# Patient Record
Sex: MALE | Race: WHITE | Employment: FULL TIME | URBAN - METROPOLITAN AREA
[De-identification: names, ages, dates, MRNs, and addresses within clinical notes are randomized per-mention and may not be internally consistent; named-entity substitution may affect disease eponyms.]

---

## 2020-09-10 ENCOUNTER — OFFICE VISIT (OUTPATIENT)
Dept: INTERNAL MEDICINE CLINIC | Facility: CLINIC | Age: 40
End: 2020-09-10
Payer: COMMERCIAL

## 2020-09-10 ENCOUNTER — APPOINTMENT (OUTPATIENT)
Dept: LAB | Age: 40
End: 2020-09-10
Attending: INTERNAL MEDICINE
Payer: COMMERCIAL

## 2020-09-10 ENCOUNTER — HOSPITAL ENCOUNTER (OUTPATIENT)
Dept: GENERAL RADIOLOGY | Facility: HOSPITAL | Age: 40
Discharge: HOME | End: 2020-09-10
Attending: INTERNAL MEDICINE
Payer: COMMERCIAL

## 2020-09-10 VITALS
HEART RATE: 69 BPM | SYSTOLIC BLOOD PRESSURE: 122 MMHG | WEIGHT: 166.63 LBS | BODY MASS INDEX: 23.33 KG/M2 | OXYGEN SATURATION: 99 % | TEMPERATURE: 97 F | DIASTOLIC BLOOD PRESSURE: 88 MMHG | HEIGHT: 70.8 IN

## 2020-09-10 DIAGNOSIS — Z00.00 ENCOUNTER FOR ANNUAL PHYSICAL EXAM: ICD-10-CM

## 2020-09-10 DIAGNOSIS — M10.9 ACUTE GOUT INVOLVING TOE OF RIGHT FOOT, UNSPECIFIED CAUSE: Primary | ICD-10-CM

## 2020-09-10 DIAGNOSIS — M10.9 ACUTE GOUT INVOLVING TOE OF RIGHT FOOT, UNSPECIFIED CAUSE: ICD-10-CM

## 2020-09-10 LAB
ALBUMIN SERPL-MCNC: 4.1 G/DL (ref 3.4–5)
ALBUMIN/GLOB SERPL: 1.2 {RATIO} (ref 1–2)
ALP LIVER SERPL-CCNC: 55 U/L (ref 45–117)
ALT SERPL-CCNC: 26 U/L (ref 16–61)
ANION GAP SERPL CALC-SCNC: 7 MMOL/L (ref 0–18)
AST SERPL-CCNC: 23 U/L (ref 15–37)
BASOPHILS # BLD AUTO: 0.01 X10(3) UL (ref 0–0.2)
BASOPHILS NFR BLD AUTO: 0.2 %
BILIRUB SERPL-MCNC: 0.6 MG/DL (ref 0.1–2)
BUN BLD-MCNC: 14 MG/DL (ref 7–18)
BUN/CREAT SERPL: 13.5 (ref 10–20)
CALCIUM BLD-MCNC: 10 MG/DL (ref 8.5–10.1)
CHLORIDE SERPL-SCNC: 104 MMOL/L (ref 98–112)
CHOLEST SMN-MCNC: 217 MG/DL (ref ?–200)
CO2 SERPL-SCNC: 28 MMOL/L (ref 21–32)
CREAT BLD-MCNC: 1.04 MG/DL (ref 0.7–1.3)
CRP SERPL-MCNC: 3.71 MG/DL (ref ?–0.3)
DEPRECATED RDW RBC AUTO: 41 FL (ref 35.1–46.3)
EOSINOPHIL # BLD AUTO: 0.06 X10(3) UL (ref 0–0.7)
EOSINOPHIL NFR BLD AUTO: 1 %
ERYTHROCYTE [DISTWIDTH] IN BLOOD BY AUTOMATED COUNT: 12.3 % (ref 11–15)
EST. AVERAGE GLUCOSE BLD GHB EST-MCNC: 103 MG/DL (ref 68–126)
GLOBULIN PLAS-MCNC: 3.4 G/DL (ref 2.8–4.4)
GLUCOSE BLD-MCNC: 79 MG/DL (ref 70–99)
HBA1C MFR BLD HPLC: 5.2 % (ref ?–5.7)
HCT VFR BLD AUTO: 42.9 % (ref 39–53)
HDLC SERPL-MCNC: 53 MG/DL (ref 40–59)
HGB BLD-MCNC: 14.3 G/DL (ref 13–17.5)
IMM GRANULOCYTES # BLD AUTO: 0.01 X10(3) UL (ref 0–1)
IMM GRANULOCYTES NFR BLD: 0.2 %
LDLC SERPL CALC-MCNC: 150 MG/DL (ref ?–100)
LYMPHOCYTES # BLD AUTO: 1.65 X10(3) UL (ref 1–4)
LYMPHOCYTES NFR BLD AUTO: 27.1 %
M PROTEIN MFR SERPL ELPH: 7.5 G/DL (ref 6.4–8.2)
MCH RBC QN AUTO: 30.6 PG (ref 26–34)
MCHC RBC AUTO-ENTMCNC: 33.3 G/DL (ref 31–37)
MCV RBC AUTO: 91.7 FL (ref 80–100)
MONOCYTES # BLD AUTO: 0.41 X10(3) UL (ref 0.1–1)
MONOCYTES NFR BLD AUTO: 6.7 %
NEUTROPHILS # BLD AUTO: 3.95 X10 (3) UL (ref 1.5–7.7)
NEUTROPHILS # BLD AUTO: 3.95 X10(3) UL (ref 1.5–7.7)
NEUTROPHILS NFR BLD AUTO: 64.8 %
NONHDLC SERPL-MCNC: 164 MG/DL (ref ?–130)
OSMOLALITY SERPL CALC.SUM OF ELEC: 287 MOSM/KG (ref 275–295)
PATIENT FASTING Y/N/NP: NO
PATIENT FASTING Y/N/NP: NO
PLATELET # BLD AUTO: 195 10(3)UL (ref 150–450)
POTASSIUM SERPL-SCNC: 4.1 MMOL/L (ref 3.5–5.1)
RBC # BLD AUTO: 4.68 X10(6)UL (ref 4.3–5.7)
SODIUM SERPL-SCNC: 139 MMOL/L (ref 136–145)
T4 FREE SERPL-MCNC: 1.4 NG/DL (ref 0.8–1.7)
TRIGL SERPL-MCNC: 70 MG/DL (ref 30–149)
TSI SER-ACNC: 2.08 MIU/ML (ref 0.36–3.74)
URATE SERPL-MCNC: 5.7 MG/DL (ref 3.5–7.2)
VLDLC SERPL CALC-MCNC: 14 MG/DL (ref 0–30)
WBC # BLD AUTO: 6.1 X10(3) UL (ref 4–11)

## 2020-09-10 PROCEDURE — 83036 HEMOGLOBIN GLYCOSYLATED A1C: CPT | Performed by: INTERNAL MEDICINE

## 2020-09-10 PROCEDURE — 3008F BODY MASS INDEX DOCD: CPT | Performed by: INTERNAL MEDICINE

## 2020-09-10 PROCEDURE — 84439 ASSAY OF FREE THYROXINE: CPT | Performed by: INTERNAL MEDICINE

## 2020-09-10 PROCEDURE — 3074F SYST BP LT 130 MM HG: CPT | Performed by: INTERNAL MEDICINE

## 2020-09-10 PROCEDURE — 85025 COMPLETE CBC W/AUTO DIFF WBC: CPT | Performed by: INTERNAL MEDICINE

## 2020-09-10 PROCEDURE — 36415 COLL VENOUS BLD VENIPUNCTURE: CPT | Performed by: INTERNAL MEDICINE

## 2020-09-10 PROCEDURE — 3079F DIAST BP 80-89 MM HG: CPT | Performed by: INTERNAL MEDICINE

## 2020-09-10 PROCEDURE — 73630 X-RAY EXAM OF FOOT: CPT | Performed by: INTERNAL MEDICINE

## 2020-09-10 PROCEDURE — 84550 ASSAY OF BLOOD/URIC ACID: CPT | Performed by: INTERNAL MEDICINE

## 2020-09-10 PROCEDURE — 80061 LIPID PANEL: CPT | Performed by: INTERNAL MEDICINE

## 2020-09-10 PROCEDURE — 80053 COMPREHEN METABOLIC PANEL: CPT | Performed by: INTERNAL MEDICINE

## 2020-09-10 PROCEDURE — 86140 C-REACTIVE PROTEIN: CPT | Performed by: INTERNAL MEDICINE

## 2020-09-10 PROCEDURE — 84443 ASSAY THYROID STIM HORMONE: CPT | Performed by: INTERNAL MEDICINE

## 2020-09-10 PROCEDURE — 99203 OFFICE O/P NEW LOW 30 MIN: CPT | Performed by: INTERNAL MEDICINE

## 2020-09-10 RX ORDER — NAPROXEN 500 MG/1
500 TABLET ORAL 2 TIMES DAILY WITH MEALS
Qty: 60 TABLET | Refills: 0 | Status: SHIPPED | OUTPATIENT
Start: 2020-09-10 | End: 2020-11-04

## 2020-09-10 RX ORDER — IBUPROFEN 600 MG/1
600 TABLET ORAL 2 TIMES DAILY
COMMUNITY
End: 2020-11-04

## 2020-09-10 NOTE — PROGRESS NOTES
Chief Complaint:   Patient presents with:  Pain: right big toe pain ,started monday morning, swollen red and bruises    Language Barrier: None    HPI:     Mr. Mitra Castro is a 47year old male past medical history of GERD who presents today for an acute visit fo use: Yes      Alcohol/week: 0.0 standard drinks      Comment: Occasional    Drug use: No    Family History:  Family History   Problem Relation Age of Onset   • Hypertension Father    • Heart Disease Father         MI age 46s   • Other (Other) Father    • C pulse 69, temperature 97 °F (36.1 °C), temperature source Temporal, height 5' 10.8\" (1.798 m), weight 166 lb 9.6 oz (75.6 kg), SpO2 99 %. Constitutional: No acute distress. Alert and oriented x 3.   Eyes: EOMI, clear sclera b/l  HENT: NCAT  Cardiovascu 500 mg twice a day for at least 5 to 7 days. Discussed the definitive duration of therapy is dependent on clinical improvement. Recommend 2 to 3 days further from resolution of symptoms  – Alternative medications available to the patient.   Discussed use

## 2020-09-10 NOTE — PATIENT INSTRUCTIONS
You were seen in clinic for right big toe pain. We are concerned for possible fracture versus a first-time gout occurrence. We will obtain an x-ray to be done for further evaluation.     If this is an acute fracture, we will refer you to a sports medicine

## 2020-09-11 ENCOUNTER — TELEPHONE (OUTPATIENT)
Dept: INTERNAL MEDICINE CLINIC | Facility: CLINIC | Age: 40
End: 2020-09-11

## 2020-09-11 DIAGNOSIS — M10.9 ACUTE GOUT INVOLVING TOE OF RIGHT FOOT, UNSPECIFIED CAUSE: Primary | ICD-10-CM

## 2020-09-11 NOTE — TELEPHONE ENCOUNTER
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 Notable blood work with total cholesterol 217, LDL cholesterol 150, CRP 3.71.      Rest of CMP, CBC, thyroid studies within n

## 2020-09-16 ENCOUNTER — TELEPHONE (OUTPATIENT)
Dept: INTERNAL MEDICINE CLINIC | Facility: CLINIC | Age: 40
End: 2020-09-16

## 2020-09-16 DIAGNOSIS — M10.9 ACUTE GOUT INVOLVING TOE OF RIGHT FOOT, UNSPECIFIED CAUSE: Primary | ICD-10-CM

## 2020-09-16 NOTE — TELEPHONE ENCOUNTER
Brief telephone note: Condition update    Attempted to call patient for condition update regarding right gout flare. Left voicemail. Wanted to make sure that treatment plan with naproxen is leading to continued improvement of gout.   Advised the patient c

## 2020-09-21 NOTE — PATIENT INSTRUCTIONS
- You were seen in clinic for regular annual check-up. We have reviewed your labs that were previously drawn. No new medications needed, we will repeat labs in 1 year  -The inflammation of your prior gout flare is improving.   Would recommend to take ibup

## 2020-09-21 NOTE — H&P
Eleuteriomathieu Wang is a 47year old male. Language Barrier: None    HPI:   Patient presents with:  Physical: annual physical exam, reports gout in right big toe has mostly resolved.       Mr. Savi Wilsno is a 47year old male past medical history of previous broken ri care of himself with a poor diet. Did not smoke regularly. - Mother: thyroid cancer that was successfully condition. Possibly environmental.   - Depression–father    SocHX  - Home: lives at home with wife and 2 daughters. Doing well at home. Feels safe. Chills, Weight Gain, Weight Loss, Night Sweats, Fatigue, Malaise  ENT/Mouth:  Hearing Changes, Ear Pain, Nasal Congestion, Sinus Pain, Hoarseness, Sore throat, Rhinorrhea, Swallowing Difficulty  Eyes: Eye Pain, Swelling, Redness, Foreign Body, Discharge, V bilaterally  Neurologic: No focal neurological deficits, CN II-XII intact, light touch intact, MSK Strength 5/5 and symmetric in all extremities, normal gait; normal rapid alternating movements, normal coordination with finger-to-nose and heel-to-shin, francis vitamin B7, vitamin H, coenzyme R) supplements resulting in serum concentrations >100 ng/mL.   Intake of the recommended daily allowance (RDA) for biotin (0.03 mg) has not been shown to typically cause significant interference; however, high dose daily diet mg/dL  Borderline  200-239 mg/dL  High      >=240 mg/dL       • HDL Cholesterol 09/10/2020 53  40 - 59 mg/dL Final    Interpretive Information:   An HDL cholesterol <40 mg/dL is low and constitutes a coronary heart disease risk factor.  An HDL cholesterol > 27.1  % Final   • Monocyte % 09/10/2020 6.7  % Final   • Eosinophil % 09/10/2020 1.0  % Final   • Basophil % 09/10/2020 0.2  % Final   • Immature Granulocyte % 09/10/2020 0.2  % Final       Imaging:  X-ray of right foot 9/10/2020  FINDINGS:          BONES: benign etiology. –Patient would like to start a food journal to see any triggering factors. Will improve hydration if it is related to constipation. –Offered dicyclomine/Bentyl as needed, but patient would like to hold off for now.     GERD  -EGD patholo 2023  Zoster (60+):  Not indicated  HPV (19-26): Not indicated   Pneumococcal: Not indicated    Return to clinic in 1 year for annual physical examination    Scott Edwards, 09/24/20, 11:34 AM

## 2020-09-21 NOTE — TELEPHONE ENCOUNTER
Brief telephone note:    Labs reviewed as below, nonurgent findings. We will follow-up on symptoms and full labs when patient sees me in clinic on /24. We will close encounter.     Scott Edwards, 09/21/20, 11:26 AM

## 2020-09-24 ENCOUNTER — OFFICE VISIT (OUTPATIENT)
Dept: INTERNAL MEDICINE CLINIC | Facility: CLINIC | Age: 40
End: 2020-09-24
Payer: COMMERCIAL

## 2020-09-24 VITALS
WEIGHT: 164 LBS | HEART RATE: 77 BPM | BODY MASS INDEX: 22.96 KG/M2 | SYSTOLIC BLOOD PRESSURE: 118 MMHG | HEIGHT: 71 IN | DIASTOLIC BLOOD PRESSURE: 74 MMHG | OXYGEN SATURATION: 98 % | TEMPERATURE: 98 F

## 2020-09-24 DIAGNOSIS — R10.30 LOWER ABDOMINAL PAIN: Chronic | ICD-10-CM

## 2020-09-24 DIAGNOSIS — M10.9 ACUTE GOUT INVOLVING TOE OF RIGHT FOOT, UNSPECIFIED CAUSE: ICD-10-CM

## 2020-09-24 DIAGNOSIS — K21.9 GASTROESOPHAGEAL REFLUX DISEASE WITHOUT ESOPHAGITIS: ICD-10-CM

## 2020-09-24 DIAGNOSIS — Z23 NEED FOR INFLUENZA VACCINATION: ICD-10-CM

## 2020-09-24 DIAGNOSIS — E78.5 DYSLIPIDEMIA: ICD-10-CM

## 2020-09-24 DIAGNOSIS — Z00.00 WELLNESS EXAMINATION: Primary | ICD-10-CM

## 2020-09-24 PROCEDURE — 3078F DIAST BP <80 MM HG: CPT | Performed by: INTERNAL MEDICINE

## 2020-09-24 PROCEDURE — 3008F BODY MASS INDEX DOCD: CPT | Performed by: INTERNAL MEDICINE

## 2020-09-24 PROCEDURE — 90471 IMMUNIZATION ADMIN: CPT | Performed by: INTERNAL MEDICINE

## 2020-09-24 PROCEDURE — 90686 IIV4 VACC NO PRSV 0.5 ML IM: CPT | Performed by: INTERNAL MEDICINE

## 2020-09-24 PROCEDURE — 3074F SYST BP LT 130 MM HG: CPT | Performed by: INTERNAL MEDICINE

## 2020-09-24 PROCEDURE — 99395 PREV VISIT EST AGE 18-39: CPT | Performed by: INTERNAL MEDICINE

## 2020-11-04 ENCOUNTER — TELEPHONE (OUTPATIENT)
Dept: SURGERY | Facility: CLINIC | Age: 40
End: 2020-11-04

## 2020-11-04 ENCOUNTER — OFFICE VISIT (OUTPATIENT)
Dept: SURGERY | Facility: CLINIC | Age: 40
End: 2020-11-04
Payer: COMMERCIAL

## 2020-11-04 VITALS
WEIGHT: 164 LBS | HEIGHT: 71 IN | DIASTOLIC BLOOD PRESSURE: 84 MMHG | HEART RATE: 56 BPM | BODY MASS INDEX: 22.96 KG/M2 | SYSTOLIC BLOOD PRESSURE: 120 MMHG

## 2020-11-04 DIAGNOSIS — Z30.09 VASECTOMY EVALUATION: Primary | ICD-10-CM

## 2020-11-04 PROCEDURE — 3074F SYST BP LT 130 MM HG: CPT | Performed by: UROLOGY

## 2020-11-04 PROCEDURE — 3008F BODY MASS INDEX DOCD: CPT | Performed by: UROLOGY

## 2020-11-04 PROCEDURE — 99072 ADDL SUPL MATRL&STAF TM PHE: CPT | Performed by: UROLOGY

## 2020-11-04 PROCEDURE — 3079F DIAST BP 80-89 MM HG: CPT | Performed by: UROLOGY

## 2020-11-04 PROCEDURE — 99244 OFF/OP CNSLTJ NEW/EST MOD 40: CPT | Performed by: UROLOGY

## 2020-11-04 RX ORDER — HYDROCODONE BITARTRATE AND ACETAMINOPHEN 5; 325 MG/1; MG/1
2 TABLET ORAL SEE ADMIN INSTRUCTIONS
Qty: 2 TABLET | Refills: 0 | Status: SHIPPED | OUTPATIENT
Start: 2020-11-04 | End: 2020-11-05

## 2020-11-04 RX ORDER — DIAZEPAM 10 MG/1
10 TABLET ORAL ONCE
Qty: 1 TABLET | Refills: 0 | Status: SHIPPED | OUTPATIENT
Start: 2020-11-04 | End: 2020-11-04

## 2020-11-04 NOTE — PROGRESS NOTES
[facility]rology  Initial Office Consultation    HPI:   Heidi Sommers is a 47year old male here today for consultation at the request of, and a copy of this note will be sent to, Chi Bennett MD.    Patient presents today in consultation for further Conjunctivae are normal.   Neck: Neck supple. Cardiovascular: Normal rate. Pulmonary/Chest: Effort normal.   Abdominal: Soft. He exhibits no distension. There is no abdominal tenderness.    Genitourinary:    Testes and penis normal.   Right testis show refrain from ejaculation for approximately one week after vasectomy. · Options for fertility after vasectomy include vasectomy reversal and sperm retrieval with in vitro fertilization. These options are not always successful, and they may be expensive.

## 2021-02-17 ENCOUNTER — TELEPHONE (OUTPATIENT)
Dept: SURGERY | Facility: CLINIC | Age: 41
End: 2021-02-17

## 2021-02-17 NOTE — TELEPHONE ENCOUNTER
Spoke with pt verrifed name and  He had questions regarding the medication and arrival time, I informed him to bring the medication to the office with him and that's its important to bring a  with him.  I also informed him that we needed him to

## 2021-02-18 ENCOUNTER — PROCEDURE (OUTPATIENT)
Dept: SURGERY | Facility: CLINIC | Age: 41
End: 2021-02-18
Payer: COMMERCIAL

## 2021-02-18 VITALS
DIASTOLIC BLOOD PRESSURE: 58 MMHG | SYSTOLIC BLOOD PRESSURE: 116 MMHG | BODY MASS INDEX: 23.1 KG/M2 | WEIGHT: 165 LBS | HEART RATE: 60 BPM | HEIGHT: 71 IN

## 2021-02-18 DIAGNOSIS — Z30.2 ENCOUNTER FOR STERILIZATION: Primary | ICD-10-CM

## 2021-02-18 PROCEDURE — 3078F DIAST BP <80 MM HG: CPT | Performed by: UROLOGY

## 2021-02-18 PROCEDURE — 3074F SYST BP LT 130 MM HG: CPT | Performed by: UROLOGY

## 2021-02-18 PROCEDURE — 3008F BODY MASS INDEX DOCD: CPT | Performed by: UROLOGY

## 2021-02-18 PROCEDURE — 55250 REMOVAL OF SPERM DUCT(S): CPT | Performed by: UROLOGY

## 2021-02-18 NOTE — PROCEDURES
UROLOGY PROCEDURE NOTE  NO-SCALPEL BILATERAL VASECTOMY      PREOPERATIVE DIAGNOSIS: Desires voluntary sterilization - bilateral vasectomy. POSTOPERATIVE DIAGNOSIS: Desires voluntary sterilization - bilateral vasectomy.     PROCEDURE PERFORMED: Voluntary segment of vas deferens was sent separately in formalin to pathology for identification. The patient tolerated the procedure well. Postprocedural instructions for care and follow-up were provided both verbally and in written form.     Balbir Malin

## 2021-04-08 ENCOUNTER — TELEMEDICINE (OUTPATIENT)
Dept: TELEHEALTH | Age: 41
End: 2021-04-08
Payer: COMMERCIAL

## 2021-04-08 ENCOUNTER — IMMUNIZATION (OUTPATIENT)
Dept: LAB | Facility: HOSPITAL | Age: 41
End: 2021-04-08
Attending: EMERGENCY MEDICINE
Payer: COMMERCIAL

## 2021-04-08 DIAGNOSIS — A09 TRAVELER'S DIARRHEA: Primary | ICD-10-CM

## 2021-04-08 DIAGNOSIS — Z23 NEED FOR VACCINATION: Primary | ICD-10-CM

## 2021-04-08 PROCEDURE — 99203 OFFICE O/P NEW LOW 30 MIN: CPT | Performed by: NURSE PRACTITIONER

## 2021-04-08 PROCEDURE — 0011A SARSCOV2 VAC 100MCG/0.5ML IM: CPT

## 2021-04-08 RX ORDER — AZITHROMYCIN 500 MG/1
500 TABLET, FILM COATED ORAL DAILY
Qty: 3 TABLET | Refills: 0 | Status: SHIPPED | OUTPATIENT
Start: 2021-04-08 | End: 2021-04-22 | Stop reason: ALTCHOICE

## 2021-04-08 NOTE — PROGRESS NOTES
Rosie Vasquez is a 47year old male who presents with c/o diarrhea    Encounter was conducted by video.        HPI:     Patient recently traveled to [facility] and believes he might have contracted diarrhea from something he ate while in [facility]. His diarrhea began Social History:  Social History    Tobacco Use      Smoking status: Never Smoker      Smokeless tobacco: Never Used    Vaping Use      Vaping Use: Never used    Alcohol use:  Yes      Alcohol/week: 0.0 standard drinks      Comment: Occasional    Drug use: N symptoms may return or get worse after eating certain foods listed below. If this happens, stop eating these foods until your symptoms ease and you feel better.   Once the vomiting stops, follow the steps below.   During the first 12 to 24 hours  During the

## 2021-04-08 NOTE — PATIENT INSTRUCTIONS
You may take over the counter Imodium or Pepto Bismol as needed to help control diarrhea. [id number]  Diet for Diarrhea (Adult)     Your symptoms may return or get worse after eating certain foods listed below.  If this happen a substitute for professional medical care. Always follow your healthcare professional's instructions.

## 2021-04-22 ENCOUNTER — TELEMEDICINE (OUTPATIENT)
Dept: TELEHEALTH | Age: 41
End: 2021-04-22

## 2021-04-22 DIAGNOSIS — K92.1 FRANK BLOOD IN STOOL: ICD-10-CM

## 2021-04-22 DIAGNOSIS — Z86.19 HISTORY OF TRAVELER'S DIARRHEA: Primary | ICD-10-CM

## 2021-04-22 PROCEDURE — 99213 OFFICE O/P EST LOW 20 MIN: CPT | Performed by: NURSE PRACTITIONER

## 2021-04-22 RX ORDER — AZITHROMYCIN 250 MG/1
TABLET, FILM COATED ORAL
Qty: 6 TABLET | Refills: 0 | Status: SHIPPED | OUTPATIENT
Start: 2021-04-22

## 2021-04-22 NOTE — PROGRESS NOTES
Jennifer Amato is a 47year old male who presents with traveler's diarrhea. Encounter was conducted by video. HPI:     Patient had a video visit about 2 weeks ago.  At that time, he had returned from [facility] a week prior to the video encounter, and he was Family History   Problem Relation Age of Onset   • Hypertension Father    • Heart Disease Father         MI age 46s   • Other (Other) Father    • Cancer Mother    • Thyroid disease Mother    • Hypertension Paternal Aunt       Social History:  Social Histor experiencing it four times per year. Although most cases of diarrhea resolve within a few days without treatment, it's important to know when to seek help.     This topic review discusses the causes and treatments of sudden onset (acute) diarrhea in adults have 20 or more bowel movements per day, happening up to every 20 or 30 minutes. In this situation, a significant amount of water and salts can be lost, seriously increasing the risk of dehydration.  Diarrhea may be accompanied by fever (temperature greater medications — Medications to reduce diarrhea are available, and are safe if there is no fever (temperature greater than 100.4°F or 38°C) and the stools are not bloody.  These medications do not cure the cause of the diarrhea, but help to reduce the frequenc spread — Adults with diarrhea should be cautious to avoid spreading infection to family, friends, and co-workers. You are considered infectious for as long as diarrhea continues.  Microorganisms causing diarrhea are spread from hand to mouth; hand washing, meat, fish, and poultry separate from other food. ? Wash hands, knives, and cutting boards after handling uncooked food, including produce and raw meat, fish, or poultry. ? Thoroughly cook raw food from animal sources to a safe internal temperature: gr pasteurized milk. ?Do not eat refrigerated pates or meat spreads. Canned or shelf-stable products may be eaten. ?Do not eat refrigerated smoked seafood unless it has been cooked.  Refrigerated smoked seafood, such as salmon, trout, whitefish, cod, elvi defined as three or more loose or watery stools per day. ?Diarrhea can be caused by infections or other factors. Sometimes, the cause of diarrhea is not known.  Diarrhea caused by an infection usually begins 12 hours to four days after exposure and resol colored. ? Medications to reduce diarrhea are available without a prescription, and are safe if there is no fever (temperature greater than 100.4°F or 38°C) and the stools are not bloody.  These medications do not cure the cause of the diarrhea, but help education: Diarrhea in children (The Basics)  Patient education: Food poisoning (The Basics)  Patient education: Lactose intolerance (The Basics)  Patient education: Antibiotic-associated diarrhea (C. difficile infection) (The Basics)  Patient education: [place] treatment of Campylobacter infection  Clostridioides (formerly Clostridium) difficile infection in adults: Clinical manifestations and diagnosis  Shigella infection: Clinical manifestations and diagnosis  Shiga toxin-producing Escherichia coli: Clinical ma [employer] are subject to the [employer] License Agreement. © 2021 UpToDate, Inc. and/or its affiliates. All Rights Reserved.   Language  Subscription and License Agreement Policies Support Tag  [place] Diet  Your healthcare provider may recommend a bland d Avoid: Chili powder, cloves, pepper, seed spices, garlic, gravy pickles, highly seasoned salad dressings  Trini last reviewed this educational content on 5/1/2018  © 8777-5106 [employer]. All rights reserved.  This information is not inten

## 2021-04-22 NOTE — PATIENT INSTRUCTIONS
1. Return to eating a bland diet. 2. Take a probiotic or eat yogurt with live cultures daily  3. If there is no change after a few days, take the azithromycin.    4. Follow up PCP for evaluation of hemorrhoid and possibly stool sample if symptoms fail to i allergies, gastrointestinal diseases such as inflammatory bowel disease, and other diseases. In addition, there are many less common causes of diarrhea. A summary of the various common causes of diarrhea is available in the table (table 1).     DIARRHEA SYM particular food or group of foods that is best while you have diarrhea. However, adequate nutrition is important during an episode of acute diarrhea. If you do not have an appetite, you can drink only liquids for a short period of time.  Boiled starches and recommended in certain situations, such as if you have the following signs or symptoms:    ? More than eight loose stools per day    ? Dehydration    ? Symptoms that continue for more than one week    ? A weakened immune system    ? You require hospitalization consumers by the Food Safety and Inspection Services (www.fsis.usda.gov) and the Centers for Disease Control and Prevention. ?Do not drink raw (unpasteurized) milk or foods that contain unpasteurized milk. ? Wash raw fruits and vegetables thoroughly b handling hot dogs, luncheon meats, delicatessen meats, and raw meat, chicken, turkey, or seafood or their juices. ?Do not eat pre-prepared salads, such as ham salad, chicken salad, egg salad, tuna salad, or seafood salad.     ?Do not eat soft cheeses suc blood and mucus    ? Bloody or black diarrhea    ? Temperature ? 38.5°C (101.3°F)    ? Passage of ?6 unformed stools per 24 hours or illness that lasts more than 48 hours    ? Severe abdominal pain/painful passage of stool    In addition, if you have persistent that contain water, salt, and sugar, such as oral rehydration solution (ORS). Sports drinks (eg, Gatorade) may be acceptable if you are not dehydrated and are otherwise healthy.  Diluted fruit juices and flavored soft drinks along with saltine crackers and the most relevant are listed below. Patient level information — [facility] offers two types of patient education materials.     The Basics — The Basics patient education pieces answer the four or five key questions a patient might have about a given condit up-to-date on the latest medical findings. These articles are thorough, long, and complex, and they contain multiple references to the research on which they are based.  Professional level articles are best for people who are comfortable with a lot of medic Society of [address] Guidelines for the Diagnosis and Management of Infectious Diarrhea. Clin Infect Dis 2017; 65:e45.  Lennie MS, Waylon HL, Mario VARGAS.  [facility] Clinical Guideline: Diagnosis, Treatment, and Prevention of Acute Diarrheal Infecti made with those spices to \"avoid\"  Vegetables  OK: Canned, cooked, fresh or frozen mildly flavored vegetables without seeds, skins or coarse fiber  Avoid: Vegetables prepared with those spices to \"avoid\"; skin and seeds of vegetables and those with coa

## 2021-04-27 ENCOUNTER — OFFICE VISIT (OUTPATIENT)
Dept: INTERNAL MEDICINE CLINIC | Facility: CLINIC | Age: 41
End: 2021-04-27
Payer: COMMERCIAL

## 2021-04-27 VITALS
HEART RATE: 63 BPM | WEIGHT: 158.81 LBS | HEIGHT: 71 IN | DIASTOLIC BLOOD PRESSURE: 70 MMHG | OXYGEN SATURATION: 98 % | TEMPERATURE: 98 F | BODY MASS INDEX: 22.23 KG/M2 | SYSTOLIC BLOOD PRESSURE: 100 MMHG

## 2021-04-27 DIAGNOSIS — K92.1 HEMATOCHEZIA: ICD-10-CM

## 2021-04-27 DIAGNOSIS — Z86.19 H/O TRAVELER'S DIARRHEA: ICD-10-CM

## 2021-04-27 DIAGNOSIS — K52.9 CHRONIC DIARRHEA: Primary | ICD-10-CM

## 2021-04-27 PROCEDURE — 3078F DIAST BP <80 MM HG: CPT | Performed by: INTERNAL MEDICINE

## 2021-04-27 PROCEDURE — 99215 OFFICE O/P EST HI 40 MIN: CPT | Performed by: INTERNAL MEDICINE

## 2021-04-27 PROCEDURE — 3074F SYST BP LT 130 MM HG: CPT | Performed by: INTERNAL MEDICINE

## 2021-04-27 PROCEDURE — 3008F BODY MASS INDEX DOCD: CPT | Performed by: INTERNAL MEDICINE

## 2021-04-27 NOTE — PATIENT INSTRUCTIONS
He was seen in clinic today for chronic diarrhea over the course of 1 month despite treatment of traveler's diarrhea. Her physical examination revealed positive microscopic blood.      We should do a full work-up for your chronic diarrhea with intermittent

## 2021-04-27 NOTE — PROGRESS NOTES
Chief Complaint:   Patient presents with:  Diarrhea: Patient c/o continued diarrhea, occasional bright red blood in stool, and abdominal light discomfort. Denies pain. Pt took probiotic only and stopped. Did not take Azithromycin.      HPI:     Mr. Sybil Lu is PPI.  He has had episodic abdominal pain localized to the bilateral lower quadrant, though this has been ongoing for 5 to 7 years periodically with frequency every 1 to 2 months or 1 time every 6 months. No specific pattern or any food triggers.      Roxy Quiñones Pain, SOB, PND, Dyspnea on Exertion, Orthopnea, Claudication, Edema, Palpitations  Respiratory: Cough, Sputum, Wheezing, Shortness of breath  Gastrointestinal: Nausea, Vomiting, Diarrhea, Constipation, Pain, Heartburn, Dysphagia, Bloody stools, Tarry stool abnormalities  -Good rectal tone, prostate soft without palpable nodules  -No overt blood on digital rectal exam.  FOBT positive    DATA REVIEWED   Labs:  I reviewed the most recent set of pertinent blood work from 9/10/2020  –A1c, liver, kidney, electroly Reflex [E]      H. Pylori Stool Ag, EIA [E]      C-Reactive Protein [E]      Comp Metabolic Panel (14) [E]      Stool Culture [E]      Ova and Parasites (non-traveler) [E]      Cyclospora Smear, Stool [E]      C. diff toxigenic PCR (OPT) [E]      Meds This

## 2021-04-28 ENCOUNTER — LAB ENCOUNTER (OUTPATIENT)
Dept: LAB | Facility: HOSPITAL | Age: 41
End: 2021-04-28
Attending: INTERNAL MEDICINE
Payer: COMMERCIAL

## 2021-04-28 DIAGNOSIS — K92.1 HEMATOCHEZIA: ICD-10-CM

## 2021-04-28 DIAGNOSIS — K52.9 CHRONIC DIARRHEA: ICD-10-CM

## 2021-04-28 PROCEDURE — 87427 SHIGA-LIKE TOXIN AG IA: CPT

## 2021-04-28 PROCEDURE — 87015 SPECIMEN INFECT AGNT CONCNTJ: CPT

## 2021-04-28 PROCEDURE — 80053 COMPREHEN METABOLIC PANEL: CPT

## 2021-04-28 PROCEDURE — 85025 COMPLETE CBC W/AUTO DIFF WBC: CPT

## 2021-04-28 PROCEDURE — 87493 C DIFF AMPLIFIED PROBE: CPT

## 2021-04-28 PROCEDURE — 87272 CRYPTOSPORIDIUM AG IF: CPT

## 2021-04-28 PROCEDURE — 36415 COLL VENOUS BLD VENIPUNCTURE: CPT

## 2021-04-28 PROCEDURE — 87046 STOOL CULTR AEROBIC BACT EA: CPT

## 2021-04-28 PROCEDURE — 87207 SMEAR SPECIAL STAIN: CPT

## 2021-04-28 PROCEDURE — 83516 IMMUNOASSAY NONANTIBODY: CPT

## 2021-04-28 PROCEDURE — 87329 GIARDIA AG IA: CPT

## 2021-04-28 PROCEDURE — 87045 FECES CULTURE AEROBIC BACT: CPT

## 2021-04-28 PROCEDURE — 82784 ASSAY IGA/IGD/IGG/IGM EACH: CPT

## 2021-04-28 PROCEDURE — 86140 C-REACTIVE PROTEIN: CPT

## 2021-04-28 PROCEDURE — 87338 HPYLORI STOOL AG IA: CPT

## 2021-04-30 ENCOUNTER — TELEPHONE (OUTPATIENT)
Dept: INTERNAL MEDICINE CLINIC | Facility: CLINIC | Age: 41
End: 2021-04-30

## 2021-04-30 NOTE — TELEPHONE ENCOUNTER
I reviewed all work-up from 4/20/2021    CMP, CRP, IgA, CBC all within normal limits    H. pylori stool antigen negative    Stool studies: C. difficile, Shiga toxin, ova and parasites, Giardia and crypto    Condition update: focused on bland diet which has

## 2021-05-12 ENCOUNTER — IMMUNIZATION (OUTPATIENT)
Dept: LAB | Facility: HOSPITAL | Age: 41
End: 2021-05-12
Attending: EMERGENCY MEDICINE
Payer: COMMERCIAL

## 2021-05-12 DIAGNOSIS — Z23 NEED FOR VACCINATION: Primary | ICD-10-CM

## 2021-05-12 PROCEDURE — 0012A SARSCOV2 VAC 100MCG/0.5ML IM: CPT

## 2021-06-02 ENCOUNTER — TELEPHONE (OUTPATIENT)
Dept: INTERNAL MEDICINE CLINIC | Facility: CLINIC | Age: 41
End: 2021-06-02

## 2021-06-02 NOTE — TELEPHONE ENCOUNTER
Are we able to call gastroenterology: Dr. Anita Vang to see if we have an earlier appointment for    [facility] for chronic diarrhea for 1 month after travel. Intermittent hematochezia, FOBT positive on clinic visit. Evaluate and treat as indicated\"    If not, can we see if there is another provider that has earlier availability for the patient?

## 2021-06-02 NOTE — TELEPHONE ENCOUNTER
Pt is calling and would like to see a GI doctor but the one recommended by Dr. Toni Tesfaye is not working out. Pt states he cant get in until September. Pt would like a new recommendation for a GI doctor. But if it is possible can Dr Toni Tesfaye get him into the first doctor before September, that would be great. (Pt.  Can not remember the name of the doctor that was recommended)

## 2021-06-03 NOTE — TELEPHONE ENCOUNTER
Called Dr. Mckenna Mcgill office. The soonest available appt is with NP. This is okay with Dr. Claudio Munoz. appt scheduled for 7/20/21 @3pm. Patient was also added to the wait list in the event of cancellations. mychart sent with appt details.

## 2021-07-14 NOTE — H&P
[address] [facility] [address] Gastroenterology                                                                                                               Reason for consult: h no  Last EGD: 2015 w/ Dr. Jackie Moore mild reflux esophagitis.  No barrets    Wt Readings from Last 6 Encounters:  07/20/21 : 162 lb (73.5 kg)  04/27/21 : 158 lb 12.8 oz (72 kg)  02/18/21 : 165 lb (74.8 kg)  11/04/20 : 164 lb (74.4 kg)  09/24/20 : 164 lb (74.4 kg anxiety and poor appetite    PHYSICAL EXAM:   Blood pressure 126/82, pulse 68, height 5' 11\" (1.803 m), weight 162 lb (73.5 kg).     GEN: WD/WN, NAD  HEENT: Supple symmetrical, trachea midline  CV: RRR, the extremities are warm and well perfused   LUNGS: N Results (from the past 4380 hour(s))   COMP METABOLIC PANEL (14)    Collection Time: 04/28/21  7:05 AM   Result Value Ref Range    Glucose 80 70 - 99 mg/dL    Sodium 140 136 - 145 mmol/L    Potassium 3.8 3.5 - 5.1 mmol/L    Chloride 106 98 - 112 mmol/L mucosa with changes consistent with mild reflux   esophagitis.     *  Diff-Quik stain negative for Helicobacter pylori organisms (appropriate   control).   *  No goblet cell metaplasia or dysplasia identified.      .  ASSESSMENT/PLAN:   Shyanne Amaro is a with the patient. I discussed the risks and benefits, including but not limited to: bleeding, perforation, infection, anesthesia complications, and even death.  Patient will be NPO after midnight and will have a person physically present at time of pick-up

## 2021-07-20 ENCOUNTER — TELEPHONE (OUTPATIENT)
Dept: GASTROENTEROLOGY | Facility: CLINIC | Age: 41
End: 2021-07-20

## 2021-07-20 ENCOUNTER — OFFICE VISIT (OUTPATIENT)
Dept: GASTROENTEROLOGY | Facility: CLINIC | Age: 41
End: 2021-07-20
Payer: COMMERCIAL

## 2021-07-20 VITALS
WEIGHT: 162 LBS | BODY MASS INDEX: 22.68 KG/M2 | HEART RATE: 68 BPM | HEIGHT: 71 IN | SYSTOLIC BLOOD PRESSURE: 126 MMHG | DIASTOLIC BLOOD PRESSURE: 82 MMHG

## 2021-07-20 DIAGNOSIS — R10.30 LOWER ABDOMINAL PAIN: ICD-10-CM

## 2021-07-20 DIAGNOSIS — K92.1 HEMATOCHEZIA: ICD-10-CM

## 2021-07-20 DIAGNOSIS — R19.7 DIARRHEA, UNSPECIFIED TYPE: Primary | ICD-10-CM

## 2021-07-20 PROCEDURE — 3008F BODY MASS INDEX DOCD: CPT | Performed by: NURSE PRACTITIONER

## 2021-07-20 PROCEDURE — 3079F DIAST BP 80-89 MM HG: CPT | Performed by: NURSE PRACTITIONER

## 2021-07-20 PROCEDURE — 3074F SYST BP LT 130 MM HG: CPT | Performed by: NURSE PRACTITIONER

## 2021-07-20 PROCEDURE — 99244 OFF/OP CNSLTJ NEW/EST MOD 40: CPT | Performed by: NURSE PRACTITIONER

## 2021-07-20 NOTE — PATIENT INSTRUCTIONS
-labs  -trial benefiber  1. Schedule colonoscopy with [facility] w/ Dr. Mitzi Roth [Diagnosis: diarrhea, hematochezia, abd pain]    2.  bowel prep from pharmacy (split miralax/gatorade)    3. Continue all medications for procedure    4.  Read all bowel prep inst

## 2021-07-20 NOTE — TELEPHONE ENCOUNTER
Scheduled for:  Colonoscopy 30003  Provider Name:  Dr. Meliza Lindsey  Date:  08/17/2021  Location:  [facility]  Sedation:  [facility]  Time:  10:30am, (pt is aware [facility] will call with arrival time)    Prep:  MiraLAX + Gatorade   Meds/Allergies Reconciled?:  Nasreen/NP reviewe

## 2021-08-17 ENCOUNTER — LAB REQUISITION (OUTPATIENT)
Dept: SURGERY | Age: 41
End: 2021-08-17
Payer: COMMERCIAL

## 2021-08-17 ENCOUNTER — SURGERY CENTER DOCUMENTATION (OUTPATIENT)
Dept: SURGERY | Age: 41
End: 2021-08-17

## 2021-08-17 DIAGNOSIS — R10.30 LOWER ABDOMINAL PAIN: ICD-10-CM

## 2021-08-17 DIAGNOSIS — K92.1 MELENA: ICD-10-CM

## 2021-08-17 DIAGNOSIS — R19.7 DIARRHEA, UNSPECIFIED TYPE: ICD-10-CM

## 2021-08-17 PROCEDURE — 45385 COLONOSCOPY W/LESION REMOVAL: CPT | Performed by: INTERNAL MEDICINE

## 2021-08-17 PROCEDURE — 88305 TISSUE EXAM BY PATHOLOGIST: CPT | Performed by: INTERNAL MEDICINE

## 2021-08-17 PROCEDURE — 45380 COLONOSCOPY AND BIOPSY: CPT | Performed by: INTERNAL MEDICINE

## 2021-08-17 NOTE — PROCEDURES
COLONOSCOPY REPORT    Cam Beasley     1980 Age 46year old   PCP Leonela Hernandez MD Endoscopist Migue Oneill MD     Date of procedure: 21    Procedure: Colonoscopy w/ hot snare and cold biopsy polypectomy    Pre-operative diagnosis: hematochez Diverticulosis: none appreciated. 3. Terminal ileum: the visualized mucosa appeared normal.    4. A retroflexed view of the rectum revealed hemorrhoids.     5. The colonic mucosa throughout the colon showed normal vascular pattern, without evidence of an

## 2021-08-19 ENCOUNTER — TELEPHONE (OUTPATIENT)
Dept: GASTROENTEROLOGY | Facility: CLINIC | Age: 41
End: 2021-08-19

## 2021-08-20 ENCOUNTER — TELEPHONE (OUTPATIENT)
Dept: GASTROENTEROLOGY | Facility: CLINIC | Age: 41
End: 2021-08-20

## 2021-08-20 NOTE — TELEPHONE ENCOUNTER
Entered into [facility]. Recall CLN in 3 years per Dr. Garrison Essex. Last CLN done 08/17/2021. Recall entered into Patient Outreach for 08/17/2024. HM updated.

## 2021-08-20 NOTE — TELEPHONE ENCOUNTER
----- Message from Nathalia Ricardo MD sent at 8/20/2021 11:54 AM CDT -----  GI staff: please place recall for colonoscopy in 3 years

## 2021-12-10 ENCOUNTER — APPOINTMENT (OUTPATIENT)
Dept: URBAN - METROPOLITAN AREA CLINIC 321 | Age: 41
Setting detail: DERMATOLOGY
End: 2021-12-12

## 2021-12-10 DIAGNOSIS — L81.4 OTHER MELANIN HYPERPIGMENTATION: ICD-10-CM

## 2021-12-10 DIAGNOSIS — D22 MELANOCYTIC NEVI: ICD-10-CM

## 2021-12-10 DIAGNOSIS — L82.1 OTHER SEBORRHEIC KERATOSIS: ICD-10-CM

## 2021-12-10 DIAGNOSIS — Z87.2 PERSONAL HISTORY OF DISEASES OF THE SKIN AND SUBCUTANEOUS TISSUE: ICD-10-CM

## 2021-12-10 PROBLEM — D22.5 MELANOCYTIC NEVI OF TRUNK: Status: ACTIVE | Noted: 2021-12-10

## 2021-12-10 PROCEDURE — 99213 OFFICE O/P EST LOW 20 MIN: CPT

## 2021-12-10 PROCEDURE — OTHER COUNSELING: OTHER

## 2021-12-10 ASSESSMENT — LOCATION ZONE DERM
LOCATION ZONE: LEG
LOCATION ZONE: TRUNK

## 2021-12-10 ASSESSMENT — LOCATION SIMPLE DESCRIPTION DERM
LOCATION SIMPLE: CHEST
LOCATION SIMPLE: RIGHT THIGH
LOCATION SIMPLE: ABDOMEN
LOCATION SIMPLE: LEFT UPPER BACK
LOCATION SIMPLE: RIGHT UPPER BACK

## 2021-12-10 ASSESSMENT — LOCATION DETAILED DESCRIPTION DERM
LOCATION DETAILED: RIGHT LATERAL ABDOMEN
LOCATION DETAILED: RIGHT ANTERIOR PROXIMAL THIGH
LOCATION DETAILED: RIGHT SUPERIOR MEDIAL UPPER BACK
LOCATION DETAILED: LEFT MEDIAL UPPER BACK
LOCATION DETAILED: UPPER STERNUM

## 2022-08-17 ENCOUNTER — LAB ENCOUNTER (OUTPATIENT)
Dept: LAB | Facility: HOSPITAL | Age: 42
End: 2022-08-17
Attending: UROLOGY
Payer: COMMERCIAL

## 2022-08-17 ENCOUNTER — TELEPHONE (OUTPATIENT)
Dept: SURGERY | Facility: CLINIC | Age: 42
End: 2022-08-17

## 2022-08-17 DIAGNOSIS — Z30.09 VASECTOMY EVALUATION: ICD-10-CM

## 2022-08-17 DIAGNOSIS — Z30.09 VASECTOMY EVALUATION: Primary | ICD-10-CM

## 2022-08-17 DIAGNOSIS — Z30.2 ENCOUNTER FOR STERILIZATION: ICD-10-CM

## 2022-08-17 PROCEDURE — 89321 SEMEN ANAL SPERM DETECTION: CPT

## 2022-08-17 NOTE — TELEPHONE ENCOUNTER
Lab called and states pt trying to submit semen analysis but no order. Order for post vas semen analysis placed. Pt had vasectomy 2/18/21 but never submitted post vas semen analysis.

## 2022-12-16 ENCOUNTER — APPOINTMENT (OUTPATIENT)
Dept: URBAN - METROPOLITAN AREA CLINIC 244 | Age: 42
Setting detail: DERMATOLOGY
End: 2022-12-19

## 2022-12-16 DIAGNOSIS — L82.1 OTHER SEBORRHEIC KERATOSIS: ICD-10-CM

## 2022-12-16 DIAGNOSIS — D22 MELANOCYTIC NEVI: ICD-10-CM

## 2022-12-16 DIAGNOSIS — L81.4 OTHER MELANIN HYPERPIGMENTATION: ICD-10-CM

## 2022-12-16 DIAGNOSIS — Z87.2 PERSONAL HISTORY OF DISEASES OF THE SKIN AND SUBCUTANEOUS TISSUE: ICD-10-CM

## 2022-12-16 DIAGNOSIS — B07.8 OTHER VIRAL WARTS: ICD-10-CM

## 2022-12-16 PROBLEM — D22.5 MELANOCYTIC NEVI OF TRUNK: Status: ACTIVE | Noted: 2022-12-16

## 2022-12-16 PROCEDURE — OTHER COUNSELING: OTHER

## 2022-12-16 PROCEDURE — 99213 OFFICE O/P EST LOW 20 MIN: CPT

## 2022-12-16 ASSESSMENT — LOCATION ZONE DERM
LOCATION ZONE: FINGER
LOCATION ZONE: LEG
LOCATION ZONE: TRUNK

## 2022-12-16 ASSESSMENT — LOCATION DETAILED DESCRIPTION DERM
LOCATION DETAILED: UPPER STERNUM
LOCATION DETAILED: RIGHT MID DORSAL SMALL FINGER
LOCATION DETAILED: RIGHT SUPERIOR MEDIAL UPPER BACK
LOCATION DETAILED: RIGHT ANTERIOR PROXIMAL THIGH
LOCATION DETAILED: LEFT MEDIAL UPPER BACK
LOCATION DETAILED: RIGHT LATERAL ABDOMEN

## 2022-12-16 ASSESSMENT — LOCATION SIMPLE DESCRIPTION DERM
LOCATION SIMPLE: LEFT UPPER BACK
LOCATION SIMPLE: RIGHT UPPER BACK
LOCATION SIMPLE: RIGHT SMALL FINGER
LOCATION SIMPLE: RIGHT THIGH
LOCATION SIMPLE: ABDOMEN
LOCATION SIMPLE: CHEST

## 2023-08-18 ENCOUNTER — LAB ENCOUNTER (OUTPATIENT)
Dept: LAB | Age: 43
End: 2023-08-18
Attending: INTERNAL MEDICINE
Payer: COMMERCIAL

## 2023-08-18 ENCOUNTER — OFFICE VISIT (OUTPATIENT)
Dept: INTERNAL MEDICINE CLINIC | Facility: CLINIC | Age: 43
End: 2023-08-18

## 2023-08-18 VITALS
TEMPERATURE: 98 F | OXYGEN SATURATION: 99 % | WEIGHT: 162 LBS | SYSTOLIC BLOOD PRESSURE: 106 MMHG | HEART RATE: 56 BPM | BODY MASS INDEX: 22.68 KG/M2 | HEIGHT: 71 IN | DIASTOLIC BLOOD PRESSURE: 62 MMHG

## 2023-08-18 DIAGNOSIS — Z13.1 SCREENING FOR DIABETES MELLITUS: ICD-10-CM

## 2023-08-18 DIAGNOSIS — Z00.00 ANNUAL PHYSICAL EXAM: ICD-10-CM

## 2023-08-18 DIAGNOSIS — E78.5 DYSLIPIDEMIA: ICD-10-CM

## 2023-08-18 DIAGNOSIS — Z00.00 ANNUAL PHYSICAL EXAM: Primary | ICD-10-CM

## 2023-08-18 DIAGNOSIS — Z13.29 SCREENING FOR THYROID DISORDER: ICD-10-CM

## 2023-08-18 DIAGNOSIS — Z87.39 HISTORY OF GOUT: ICD-10-CM

## 2023-08-18 DIAGNOSIS — K21.9 GASTROESOPHAGEAL REFLUX DISEASE WITHOUT ESOPHAGITIS: ICD-10-CM

## 2023-08-18 DIAGNOSIS — Z13.0 SCREENING FOR DEFICIENCY ANEMIA: ICD-10-CM

## 2023-08-18 LAB
ALBUMIN SERPL-MCNC: 4.1 G/DL (ref 3.4–5)
ALBUMIN/GLOB SERPL: 1.4 {RATIO} (ref 1–2)
ALP LIVER SERPL-CCNC: 48 U/L
ALT SERPL-CCNC: 26 U/L
ANION GAP SERPL CALC-SCNC: 5 MMOL/L (ref 0–18)
AST SERPL-CCNC: 19 U/L (ref 15–37)
BASOPHILS # BLD AUTO: 0.02 X10(3) UL (ref 0–0.2)
BASOPHILS NFR BLD AUTO: 0.4 %
BILIRUB SERPL-MCNC: 0.4 MG/DL (ref 0.1–2)
BUN BLD-MCNC: 17 MG/DL (ref 7–18)
BUN/CREAT SERPL: 15.3 (ref 10–20)
CALCIUM BLD-MCNC: 9.3 MG/DL (ref 8.5–10.1)
CHLORIDE SERPL-SCNC: 108 MMOL/L (ref 98–112)
CHOLEST SERPL-MCNC: 223 MG/DL (ref ?–200)
CO2 SERPL-SCNC: 29 MMOL/L (ref 21–32)
CREAT BLD-MCNC: 1.11 MG/DL
DEPRECATED RDW RBC AUTO: 41.1 FL (ref 35.1–46.3)
EGFRCR SERPLBLD CKD-EPI 2021: 86 ML/MIN/1.73M2 (ref 60–?)
EOSINOPHIL # BLD AUTO: 0.03 X10(3) UL (ref 0–0.7)
EOSINOPHIL NFR BLD AUTO: 0.6 %
ERYTHROCYTE [DISTWIDTH] IN BLOOD BY AUTOMATED COUNT: 12.5 % (ref 11–15)
FASTING PATIENT LIPID ANSWER: YES
FASTING STATUS PATIENT QL REPORTED: YES
GLOBULIN PLAS-MCNC: 2.9 G/DL (ref 2.8–4.4)
GLUCOSE BLD-MCNC: 82 MG/DL (ref 70–99)
HCT VFR BLD AUTO: 42.4 %
HDLC SERPL-MCNC: 49 MG/DL (ref 40–59)
HGB BLD-MCNC: 14.2 G/DL
IMM GRANULOCYTES # BLD AUTO: 0.01 X10(3) UL (ref 0–1)
IMM GRANULOCYTES NFR BLD: 0.2 %
LDLC SERPL CALC-MCNC: 165 MG/DL (ref ?–100)
LYMPHOCYTES # BLD AUTO: 1.64 X10(3) UL (ref 1–4)
LYMPHOCYTES NFR BLD AUTO: 34.8 %
MCH RBC QN AUTO: 30.2 PG (ref 26–34)
MCHC RBC AUTO-ENTMCNC: 33.5 G/DL (ref 31–37)
MCV RBC AUTO: 90.2 FL
MONOCYTES # BLD AUTO: 0.37 X10(3) UL (ref 0.1–1)
MONOCYTES NFR BLD AUTO: 7.9 %
NEUTROPHILS # BLD AUTO: 2.64 X10 (3) UL (ref 1.5–7.7)
NEUTROPHILS # BLD AUTO: 2.64 X10(3) UL (ref 1.5–7.7)
NEUTROPHILS NFR BLD AUTO: 56.1 %
NONHDLC SERPL-MCNC: 174 MG/DL (ref ?–130)
OSMOLALITY SERPL CALC.SUM OF ELEC: 295 MOSM/KG (ref 275–295)
PLATELET # BLD AUTO: 212 10(3)UL (ref 150–450)
POTASSIUM SERPL-SCNC: 4.4 MMOL/L (ref 3.5–5.1)
PROT SERPL-MCNC: 7 G/DL (ref 6.4–8.2)
RBC # BLD AUTO: 4.7 X10(6)UL
SODIUM SERPL-SCNC: 142 MMOL/L (ref 136–145)
TRIGL SERPL-MCNC: 55 MG/DL (ref 30–149)
TSI SER-ACNC: 2.16 MIU/ML (ref 0.36–3.74)
URATE SERPL-MCNC: 6.5 MG/DL
VLDLC SERPL CALC-MCNC: 11 MG/DL (ref 0–30)
WBC # BLD AUTO: 4.7 X10(3) UL (ref 4–11)

## 2023-08-18 PROCEDURE — 99396 PREV VISIT EST AGE 40-64: CPT | Performed by: INTERNAL MEDICINE

## 2023-08-18 PROCEDURE — 3008F BODY MASS INDEX DOCD: CPT | Performed by: INTERNAL MEDICINE

## 2023-08-18 PROCEDURE — 84443 ASSAY THYROID STIM HORMONE: CPT

## 2023-08-18 PROCEDURE — 85025 COMPLETE CBC W/AUTO DIFF WBC: CPT

## 2023-08-18 PROCEDURE — 80053 COMPREHEN METABOLIC PANEL: CPT

## 2023-08-18 PROCEDURE — 3078F DIAST BP <80 MM HG: CPT | Performed by: INTERNAL MEDICINE

## 2023-08-18 PROCEDURE — 80061 LIPID PANEL: CPT

## 2023-08-18 PROCEDURE — 3074F SYST BP LT 130 MM HG: CPT | Performed by: INTERNAL MEDICINE

## 2023-08-18 PROCEDURE — 36415 COLL VENOUS BLD VENIPUNCTURE: CPT

## 2023-08-18 PROCEDURE — 84550 ASSAY OF BLOOD/URIC ACID: CPT

## 2023-08-18 NOTE — PATIENT INSTRUCTIONS
- You were seen in clinic for regular annual check-up. We have ordered labs for you and we will call you with the results. Please obtain the bloodwork fasting for 12 hours. OK to drink water the day of your blood draw. We have the lab downstairs on the first floor. No appointment is necessary. Lab hours are Monday-Friday 7:30 AM to 4:45 PM.  There may be Saturday hours 7 AM-11:00 AM as well. Otherwise you can obtain the blood tests on the weekends at the main hospital or local immediate care/urgent care within the [facility].    -We discussed that you are doing a good job with modifiable risk factors with maintaining his good overall weight, undergoing cardiovascular exercise, undergoing good nutritional habits.  - We also discussed nonmodifiable risk factors with age over time and family history that we should keep a close eye on cholesterol levels over time      -Continue adhering to a low purine diet/low uric acid diet to reduce risk of recurrence of gout in the future  - Your next colonoscopy will be due in 2024 with Dr. Hazel Fuller of gastroenterology  -Vaccines you may be due for: Tdap/tetanus shot, COVID dose #4  - Please continue to eat a varied diet including recommended servings of vegetables, fruits, and low fat dairy. Minimize high saturated fats (such as fast foods) and high sugar intake (such as soda)  - We recommend 150 minutes of moderate intensity exercise (brisk walking, swimming) weekly to maintain your current weight. Targeted weight loss will require more vigorous exercise or more than 150 minutes/week. Return to clinic in 1 year for annual physical examination    Overall, you are doing great job with maintaining your health. Keep up the good work with nutrition, exercise, stress coping mechanisms. You are at optimal health at this time and do not hesitate to call/send a [employer] message/make a sooner appointment if any new medical concerns arise.

## 2023-08-21 ENCOUNTER — TELEPHONE (OUTPATIENT)
Dept: INTERNAL MEDICINE CLINIC | Facility: CLINIC | Age: 43
End: 2023-08-21

## 2023-08-21 NOTE — TELEPHONE ENCOUNTER
Please notify patient that I reviewed the blood work from 8/18    Labs  LDL/bad cholesterol remains elevated at 165. Total cholesterol is borderline high at 223  No medications are warranted (ASCVD 1.2%). Continue optimizing nutrition by trying to moderate animal product intake to see if this can help with the improvement of cholesterol.     Otherwise all other blood tests look good, no other new recommendations for now

## 2024-05-23 ENCOUNTER — TELEPHONE (OUTPATIENT)
Facility: CLINIC | Age: 44
End: 2024-05-23

## 2024-05-23 NOTE — TELEPHONE ENCOUNTER
Patient outreach message received:    Recall CLN in 3 years per Dr. Tapia. Last CLN done 08/17/2021. Recall entered into Patient Outreach for 08/17/2024     Recall reminder letter mailed out to patient.

## 2024-08-06 ENCOUNTER — TELEPHONE (OUTPATIENT)
Facility: CLINIC | Age: 44
End: 2024-08-06

## 2024-08-06 DIAGNOSIS — Z80.0 FAMILY HISTORY OF COLON CANCER: Primary | ICD-10-CM

## 2024-08-06 NOTE — TELEPHONE ENCOUNTER
Dr. Tapia,   Pt states his brother was recently diagnosed with Barretts. Pt is asking if he should have an EGD also. He denies pain or dysphagia but would prefer to do EGD if you are agreeable. Please give orders when able.  Thanks,  Yarelis Naylor Procedure, Date, MD:  Colon 2021 with Dr. Tapia  Last Diagnosis:  SSA, TVA  Recalled (mth/yrs): 3 years  Sedation Used Previously:  MAC  Last Prep Used (if known):  miralax/gatorade  Quality Of Prep (if known): good with washing  Anticoagulants: No  Diuretics: No  Diabetic Med's (PO/Injectables): No  Weight loss Med's: No  Iron/Herbal/Multivitamin Supplements (RX/OTC): No  Marijuana/Vaping/CBD: No  Height & Weight: 5'11\"/162 lbs  BMI: 22.6  Hx of Cardiac/CVA Issues/(MI/Stroke): No  Devices Pacemaker/Defibrillator/Stents: No  Respiratory Issues/Oxygen Use/DEB/COPD: No  Issues w/ Anesthesia: No    Symptoms (Y/N): No  Symptoms Details: N/A    Special Comments/Notes: See above.    Please advise on orders and prep. Meds, allergies, and pharmacy verified with pt.     Thank you!      Jose R Tapia MD   Physician  Specialty: GASTROENTEROLOGY     Procedures     Signed     Encounter Date: 2021     Signed         COLONOSCOPY REPORT           Alejandro Villarreal      1980 Age 41 year old   PCP Scott Edwards MD Endoscopist Jose R Tapia MD      Date of procedure: 21     Procedure: Colonoscopy w/ hot snare and cold biopsy polypectomy     Pre-operative diagnosis: hematochezia and diarrhea     Post-operative diagnosis: polyps and hemorrhoids     Medications: MAC sedation  Withdrawal time: 17 minutes     Procedure:  Informed consent was obtained from the patient after the risks of the procedure were discussed, including but not limited to bleeding, perforation, aspiration, infection, or possibility of a missed lesion. After discussions of the risks/benefits and alternatives to this procedure, as well as the planned sedation, the patient was placed in the left lateral  decubitus position and begun on continuous blood pressure pulse oximetry and EKG monitoring and this was maintained throughout the procedure. Once an adequate level of sedation was obtained a digital rectal exam was completed. Then the lubricated tip of the [id number] diagnostic video colonoscope was inserted and advanced without difficulty to the cecum using the CO2 insufflation technique. The cecum was identified by localizing the trifold, the appendix and the ileocecal valve. Withdrawal was begun with thorough washing and careful examination of the colonic walls and folds. A routine second examination of the cecum/ascending colon was performed. Photodocumentation was obtained. The bowel prep was good. Views of the colon were good with washing. I then carefully withdrew the instrument from the patient who tolerated the procedure well.      Complications: none.     Findings:   1. 3 polyp(s) noted as follows:      A. 4-5 mm polyp in the ascending colon; flat morphology; cold biopsy polypectomy and retrieved.      B. 10 mm polyp in the sigmoid colon; pedunculated polyp on a stalk; hot snare polypectomy and retrieved. Clip placed due to risk of bleeding     2. Diverticulosis: none appreciated.     3. Terminal ileum: the visualized mucosa appeared normal.     4. A retroflexed view of the rectum revealed hemorrhoids.     5. The colonic mucosa throughout the colon showed normal vascular pattern, without evidence of angioectasias or inflammation. Random biopsies taken to rule out microscopic colitis     6. JEAN: normal rectal tone, no masses palpated.         Recommend:  Await pathology. The interval for the next colonoscopy will be determined after reviewing pathology. If new signs or symptoms develop, colonoscopy may need to be repeated sooner.   High fiber diet.  Monitor for blood in the stool. If having more than just tinge of blood, call office or go to the ER.     >>>If tissue was obtained and you have not  received your pathology results either by phone or letter within 2 weeks, please call our office at [phone].     Specimens: ascending polyps, sigmoid polyp and random colon biopsies                  Electronically signed by Jose R Tapia MD at 8/17/2021 12:04 PM  Final Diagnosis:      A. Random colon biopsy:  Colonic mucosa with no significant pathologic change.  No evidence of lymphocytic or collagenous colitis.     B. Ascending colon polyp x2:  Sessile serrated adenoma fragments x3.  Fragment of colonic mucosa with prominent intramucosal lymphoid aggregate.     C. Sigmoid colon polyp:  Tubulovillous adenoma, negative for high-grade dysplasia.  Inked cauterized edge is negative for low-grade dysplasia.

## 2024-08-30 RX ORDER — SODIUM, POTASSIUM,MAG SULFATES 17.5-3.13G
SOLUTION, RECONSTITUTED, ORAL ORAL
Qty: 1 EACH | Refills: 0 | Status: SHIPPED | OUTPATIENT
Start: 2024-08-30

## 2024-08-30 NOTE — TELEPHONE ENCOUNTER
Dr. Tapia,   Pt's brother was diagnosed with Barretts. Pt is due for colonoscopy and asking if he should have an EGD because of brother's new diagnosis. Pt denies pain or dysphagia. Did you want me to get records from brother's procedure?  Thanks,  Yarelis

## 2024-08-30 NOTE — TELEPHONE ENCOUNTER
Scheduled for:  Colonoscopy 86687    Provider Name:  Dr Tapia    Date:  9/10/2024    Location:    [facility]    Sedation:  MAC    Time:  9:30 am (Patient made aware [facility] will call the day before with an arrival time)    Prep:  suprep    Meds/Allergies Reconciled?:  Physician reviewed     Diagnosis with codes:    Family history of colon cancer [Z80.0]     Was patient informed to call insurance with codes (Y/N):  Yes, I confirmed [employer] insurance with the patient.     Referral sent?:  N/A    EM or [facility] notified?:  I sent an electronic request to Endo Scheduling and received a confirmation today.      Medication Orders:  This patient verbally confirmed that he is not taking:   Iron, blood thinners, BP meds, and is not diabetic   Not latex allergy, Not PCN allergy and does not have a pacemaker     Misc Orders:       Further instructions given by staff:   I discussed the prep instructions with the patient which he verbally understood and is aware that I will send the instructions today via [employer]

## 2024-08-30 NOTE — TELEPHONE ENCOUNTER
Sorry I mis-read.   NO, if no high risk symptoms or history no need for EGD  No acid reflux, history of smoking, family history of esophageal CA etc  Ok to proceed with just colonoscopy

## 2024-08-30 NOTE — TELEPHONE ENCOUNTER
He was diagnosed with osborne's on EGD?  How long ago?  Would need to see if short segment/long segment and if any dysplasia to know surveillance time frame. Can add on to colonoscopy if appropriate    Please obtain EGD report and pathology    1. Schedule colonoscopy with MAC [Diagnosis: surveillance for history of polyps]    2.  suprep or miralax/gatorade split prep    3. Continue all medications for procedure except    ** If MAC @ [facility]/NE:    - NO alcohol, recreational drugs nor erectile dysfunction mediations 72 hours before procedure(s)   - NO herbal supplements or weight loss medications x 7 days prior to the procedure(s)    ** If MAC @ [facility] or IV twilight - continue all medications as prescribed    4. Read all bowel prep instructions carefully    5. AVOID seeds, nuts, popcorn, raw fruits and vegetables (cooked is okay) for 5 days before procedure        >>>Please note: if you were prescribed Suprep for the bowel prep and it is too expensive or not covered by insurance, it is okay to substitute Trilyte (or any similar generic prep). This can be done by notifying the pharmacy or calling our office.

## 2024-09-10 PROCEDURE — 88305 TISSUE EXAM BY PATHOLOGIST: CPT | Performed by: INTERNAL MEDICINE

## 2024-10-14 ENCOUNTER — APPOINTMENT (OUTPATIENT)
Dept: URBAN - METROPOLITAN AREA CLINIC 244 | Age: 44
Setting detail: DERMATOLOGY
End: 2024-10-14

## 2024-10-14 DIAGNOSIS — D22 MELANOCYTIC NEVI: ICD-10-CM

## 2024-10-14 DIAGNOSIS — Z12.83 ENCOUNTER FOR SCREENING FOR MALIGNANT NEOPLASM OF SKIN: ICD-10-CM

## 2024-10-14 DIAGNOSIS — L81.4 OTHER MELANIN HYPERPIGMENTATION: ICD-10-CM

## 2024-10-14 DIAGNOSIS — Z87.2 PERSONAL HISTORY OF DISEASES OF THE SKIN AND SUBCUTANEOUS TISSUE: ICD-10-CM

## 2024-10-14 DIAGNOSIS — L82.1 OTHER SEBORRHEIC KERATOSIS: ICD-10-CM

## 2024-10-14 PROBLEM — D22.5 MELANOCYTIC NEVI OF TRUNK: Status: ACTIVE | Noted: 2024-10-14

## 2024-10-14 PROCEDURE — OTHER COUNSELING: OTHER

## 2024-10-14 PROCEDURE — OTHER MIPS QUALITY: OTHER

## 2024-10-14 PROCEDURE — 99213 OFFICE O/P EST LOW 20 MIN: CPT

## 2024-10-14 ASSESSMENT — LOCATION SIMPLE DESCRIPTION DERM
LOCATION SIMPLE: RIGHT THIGH
LOCATION SIMPLE: RIGHT UPPER BACK
LOCATION SIMPLE: LEFT UPPER BACK
LOCATION SIMPLE: RIGHT SHOULDER
LOCATION SIMPLE: ABDOMEN

## 2024-10-14 ASSESSMENT — LOCATION DETAILED DESCRIPTION DERM
LOCATION DETAILED: RIGHT LATERAL ABDOMEN
LOCATION DETAILED: RIGHT MID-UPPER BACK
LOCATION DETAILED: RIGHT ANTERIOR PROXIMAL THIGH
LOCATION DETAILED: RIGHT POSTERIOR SHOULDER
LOCATION DETAILED: LEFT MID-UPPER BACK

## 2024-10-14 ASSESSMENT — LOCATION ZONE DERM
LOCATION ZONE: LEG
LOCATION ZONE: TRUNK
LOCATION ZONE: ARM

## 2024-11-09 ENCOUNTER — TELEPHONE (OUTPATIENT)
Dept: INTERNAL MEDICINE CLINIC | Facility: CLINIC | Age: 44
End: 2024-11-09

## 2024-11-09 NOTE — TELEPHONE ENCOUNTER
Received outside hospital records from Dr. Joseph Soto dermatology 10/14/2024  - Mildly dysplastic nevus right thigh and abdomen.  Well-healed scar without evidence of recurrence.  Screening every 6 months per patient preference.

## 2024-11-15 ENCOUNTER — TELEPHONE (OUTPATIENT)
Dept: INTERNAL MEDICINE CLINIC | Facility: CLINIC | Age: 44
End: 2024-11-15

## 2024-11-15 DIAGNOSIS — Z13.29 SCREENING FOR THYROID DISORDER: ICD-10-CM

## 2024-11-15 DIAGNOSIS — Z13.0 SCREENING FOR DEFICIENCY ANEMIA: ICD-10-CM

## 2024-11-15 DIAGNOSIS — E78.5 DYSLIPIDEMIA: ICD-10-CM

## 2024-11-15 DIAGNOSIS — Z87.39 HISTORY OF GOUT: ICD-10-CM

## 2024-11-15 DIAGNOSIS — Z13.1 SCREENING FOR DIABETES MELLITUS: ICD-10-CM

## 2024-11-15 DIAGNOSIS — Z00.00 ANNUAL PHYSICAL EXAM: Primary | ICD-10-CM

## 2024-11-15 NOTE — TELEPHONE ENCOUNTER
Patient called to request Annual Labs be entered so he can complete them this weekend prior to his Monday 11/18/24 Annual Physical Appointment with Dr. Edwards.     Please send patient a [employer] message letting him know when labs are entered.

## 2024-11-16 ENCOUNTER — LAB ENCOUNTER (OUTPATIENT)
Dept: LAB | Facility: HOSPITAL | Age: 44
End: 2024-11-16
Attending: INTERNAL MEDICINE
Payer: COMMERCIAL

## 2024-11-16 DIAGNOSIS — Z13.1 SCREENING FOR DIABETES MELLITUS: ICD-10-CM

## 2024-11-16 DIAGNOSIS — Z87.39 HISTORY OF GOUT: ICD-10-CM

## 2024-11-16 DIAGNOSIS — Z13.29 SCREENING FOR THYROID DISORDER: ICD-10-CM

## 2024-11-16 DIAGNOSIS — Z13.0 SCREENING FOR DEFICIENCY ANEMIA: ICD-10-CM

## 2024-11-16 DIAGNOSIS — E78.5 DYSLIPIDEMIA: ICD-10-CM

## 2024-11-16 DIAGNOSIS — Z00.00 ANNUAL PHYSICAL EXAM: ICD-10-CM

## 2024-11-16 LAB
ALBUMIN SERPL-MCNC: 4.3 G/DL (ref 3.2–4.8)
ALBUMIN/GLOB SERPL: 1.7 {RATIO} (ref 1–2)
ALP LIVER SERPL-CCNC: 47 U/L
ALT SERPL-CCNC: 14 U/L
ANION GAP SERPL CALC-SCNC: 6 MMOL/L (ref 0–18)
AST SERPL-CCNC: 20 U/L (ref ?–34)
BASOPHILS # BLD AUTO: 0.03 X10(3) UL (ref 0–0.2)
BASOPHILS NFR BLD AUTO: 0.6 %
BILIRUB SERPL-MCNC: 0.6 MG/DL (ref 0.3–1.2)
BUN BLD-MCNC: 18 MG/DL (ref 9–23)
BUN/CREAT SERPL: 15.9 (ref 10–20)
CALCIUM BLD-MCNC: 9.9 MG/DL (ref 8.7–10.4)
CHLORIDE SERPL-SCNC: 107 MMOL/L (ref 98–112)
CHOLEST SERPL-MCNC: 248 MG/DL (ref ?–200)
CO2 SERPL-SCNC: 30 MMOL/L (ref 21–32)
CREAT BLD-MCNC: 1.13 MG/DL
DEPRECATED RDW RBC AUTO: 39.8 FL (ref 35.1–46.3)
EGFRCR SERPLBLD CKD-EPI 2021: 83 ML/MIN/1.73M2 (ref 60–?)
EOSINOPHIL # BLD AUTO: 0.07 X10(3) UL (ref 0–0.7)
EOSINOPHIL NFR BLD AUTO: 1.3 %
ERYTHROCYTE [DISTWIDTH] IN BLOOD BY AUTOMATED COUNT: 12.4 % (ref 11–15)
FASTING PATIENT LIPID ANSWER: YES
FASTING STATUS PATIENT QL REPORTED: YES
GLOBULIN PLAS-MCNC: 2.5 G/DL (ref 2–3.5)
GLUCOSE BLD-MCNC: 84 MG/DL (ref 70–99)
HCT VFR BLD AUTO: 42.7 %
HDLC SERPL-MCNC: 47 MG/DL (ref 40–59)
HGB BLD-MCNC: 14.4 G/DL
IMM GRANULOCYTES # BLD AUTO: 0.01 X10(3) UL (ref 0–1)
IMM GRANULOCYTES NFR BLD: 0.2 %
LDLC SERPL CALC-MCNC: 187 MG/DL (ref ?–100)
LYMPHOCYTES # BLD AUTO: 1.89 X10(3) UL (ref 1–4)
LYMPHOCYTES NFR BLD AUTO: 35.4 %
MCH RBC QN AUTO: 29.6 PG (ref 26–34)
MCHC RBC AUTO-ENTMCNC: 33.7 G/DL (ref 31–37)
MCV RBC AUTO: 87.7 FL
MONOCYTES # BLD AUTO: 0.44 X10(3) UL (ref 0.1–1)
MONOCYTES NFR BLD AUTO: 8.2 %
NEUTROPHILS # BLD AUTO: 2.9 X10 (3) UL (ref 1.5–7.7)
NEUTROPHILS # BLD AUTO: 2.9 X10(3) UL (ref 1.5–7.7)
NEUTROPHILS NFR BLD AUTO: 54.3 %
NONHDLC SERPL-MCNC: 201 MG/DL (ref ?–130)
OSMOLALITY SERPL CALC.SUM OF ELEC: 297 MOSM/KG (ref 275–295)
PLATELET # BLD AUTO: 205 10(3)UL (ref 150–450)
POTASSIUM SERPL-SCNC: 4.1 MMOL/L (ref 3.5–5.1)
PROT SERPL-MCNC: 6.8 G/DL (ref 5.7–8.2)
RBC # BLD AUTO: 4.87 X10(6)UL
SODIUM SERPL-SCNC: 143 MMOL/L (ref 136–145)
TRIGL SERPL-MCNC: 83 MG/DL (ref 30–149)
TSI SER-ACNC: 2.28 UIU/ML (ref 0.55–4.78)
URATE SERPL-MCNC: 7.1 MG/DL
VLDLC SERPL CALC-MCNC: 17 MG/DL (ref 0–30)
WBC # BLD AUTO: 5.3 X10(3) UL (ref 4–11)

## 2024-11-16 PROCEDURE — 84443 ASSAY THYROID STIM HORMONE: CPT

## 2024-11-16 PROCEDURE — 36415 COLL VENOUS BLD VENIPUNCTURE: CPT

## 2024-11-16 PROCEDURE — 80053 COMPREHEN METABOLIC PANEL: CPT

## 2024-11-16 PROCEDURE — 85025 COMPLETE CBC W/AUTO DIFF WBC: CPT

## 2024-11-16 PROCEDURE — 80061 LIPID PANEL: CPT

## 2024-11-16 PROCEDURE — 84550 ASSAY OF BLOOD/URIC ACID: CPT

## 2024-11-16 NOTE — PATIENT INSTRUCTIONS
- You were seen in clinic for regular annual check-up. We have ordered labs for you and we will call you with the results. Please obtain the bloodwork fasting for 10**12 hours. OK to drink water the day of your blood draw.      We have the lab downstairs on the first floor.  No appointment is necessary.  Lab hours are Monday-Friday 7:30 AM to 4:45 PM.  There may be Saturday hours 7 AM-11:00 AM as well.     Otherwise you can obtain the blood tests on the weekends at the main hospital or local immediate care/urgent care within the [facility].    -Lets plan to repeat another cholesterol level in 4-6 months.  Will try to manage bring this down with good nutrition, exercise  - There may be at higher risk given family history of heart disease which could be due to a genetic component    Lets assess risk with a CT calcium score.  Have the CT department send the results over to your primary care doctor for my own review    -Lets also consider Mediterranean diet, DASH diet which promotes lean, low-fat protein as well as good fiber intake with vegetables and fruits    - Monitor for any changes concerning for gout flareups  -Please continue checking your blood pressures at home and notify us if they are increasing  - Vaccines may be due for: COVID dose #4, tetanus shot  - Please continue to eat a varied diet including recommended servings of vegetables, fruits, and low fat dairy. Minimize high saturated fats (such as fast foods) and high sugar intake (such as soda)  - We recommend 150 minutes of moderate intensity exercise (brisk walking, swimming) weekly to maintain your current weight.  Targeted weight loss will require more vigorous exercise or more than 150 minutes/week.    Return to clinic in 1 year for annual physical examination

## 2024-11-16 NOTE — H&P
Alejandro Villarreal is a 44 year old male.      HPI:     Chief Complaint   Patient presents with    Physical     Mr. Villarreal is a 44 year old male past medical history of previous broken right big toe, GERD, and recent acute gout flare who presents today for annual physical examination.    Sleep 7 hours.    Work-life balance is OK. No anxiety nor depression.    Concerned about cholesterol that runs in the fmaily. Father had a heart attack age 52    I did review the patient's past medical history, past surgical history, family history, social history and updated as below    SocHX  - Home: lives at home with wife and 2 daughters. Doing well at home.  Feels safe.  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 Workis going well.  - Hobbies: Bags - 3rd season, Running, Going to the gym; wave surfing.  - Nutrition: Salad for lunch, protein - chicken/fish, a little bit of everything. Travels a lot for work - eats out a lot. Alcohol - socially.   - Physical activity: 3 times a week, moderate intensity.  Running 2-3x a week.    HISTORY:  Past Medical History:    Broken toe    right big toe     GERD (gastroesophageal reflux disease)    EGD 9-15 normal except mild nonerosive reflux changes      Past Surgical History:   Procedure Laterality Date    Inguinal hernia repair Right February 2015    mesh    Vasectomy  02/18/2021      Family History   Problem Relation Age of Onset    Hypertension Father     Heart Disease Father         MI age 50s    Other (Other) Father     Cancer Mother     Thyroid disease Mother     Hypertension Paternal Aunt       Social History:   Social History     Socioeconomic History    Marital status:     Number of children: 1   Occupational History    Occupation: Finance     Comment: [employer]   Tobacco Use    Smoking status: Never    Smokeless tobacco: Never   Vaping Use    Vaping status: Never Used   Substance and Sexual Activity    Alcohol use: Yes     Alcohol/week: 0.0 standard drinks of alcohol      Comment: 1-2x/week    Drug use: No        Medications (Active prior to today's visit):  No current outpatient medications on file.       Allergies:  No Known Allergies      ROS:   Positive Findings indicated in BOLD    Constitutional: Fever, Chills, Weight Gain, Weight Loss, Night Sweats, Fatigue, Malaise  ENT/Mouth:  Hearing Changes, Ear Pain, Nasal Congestion, Sinus Pain, Hoarseness, Sore throat, Rhinorrhea, Swallowing Difficulty  Eyes: Eye Pain, Swelling, Redness, Foreign Body, Discharge, Vision Changes  Cardiovascular: Chest Pain, SOB, PND, Dyspnea on Exertion, Orthopnea, Claudication, Edema, Palpitations  Respiratory: Cough, Sputum, Wheezing, Shortness of breath  Gastrointestinal: Nausea, Vomiting, Diarrhea, Constipation, Pain , Heartburn, Dysphagia, Bloody stools, Tarry stools  Genitourinary: Dysmenorrhea, Dysuria, Urinary Frequency, Hematuria, Urinary Incontinence, Urgency,  Flank Pain  Musculoskeletal: Arthralgias, Myalgias, Joint Swelling, Joint Stiffness, Back Pain, Neck Pain  Integumentary: Skin Lesions, Pruritis, Hair Changes, Jaundice, Nail changes  Neuro: Weakness, Numbness, Paresthesias, Loss of Consciousness, Syncope, Dizziness, Headache, Falls  Psych: Anxiety, Depression, Insomnia, Suicidal Ideation, Homicidal ideation, Memory Changes  Heme/Lymph: Bruising, Bleeding, Lymphadenopathy  Endocrine: Polyuria, Polydipsia, Temperature Intolerance    PHYSICAL EXAM:   Vital Signs:  Blood pressure 126/82, pulse 81, temperature 98.1 °F (36.7 °C), temperature source Oral, height 5' 11.1\" (1.806 m), weight 165 lb 9.6 oz (75.1 kg), SpO2 98%.     Constitutional: No acute distress. Alert and oriented x 3.  Eyes: EOMI, PERRLA, clear sclera b/l  HENT: NCAT, Moist mucous membranes, Oropharynx without erythema or exudates  Neck: No JVD, no thyromegaly, no carotid bruits or thrills  Cardiovascular: S1, S2, no S3, no S4, Regular rate and rhythm, No murmurs/gallops/rubs.   Vascular: Equal pulses 2+  carotid/radial/PT/DP  Respiratory: Clear to auscultation bilaterally.  No wheezes/rales/rhonchi  Gastrointestinal: Soft, nontender, nondistended. Positive bowel sounds x 4. No rebound tenderness. No hepatomegaly, No splenomegaly  Genitourinary: No CVA tenderness bilaterally  Neurologic: No focal neurological deficits, CN II-XII intact, light touch intact, MSK Strength 5/5 and symmetric in all extremities, normal gait; normal rapid alternating movements, normal coordination with finger-to-nose and heel-to-shin, deep tendon reflexes 2+ throughout  Musculoskeletal: Full range of motion of all extremities, no clubbing/swelling/edema  Skin No erythema, no jaundice, Cap Refill < 2s  Psychiatric: Appropriate mood and affect  Heme/Lymph/Immune: No cervical LAD      DATA REVIEWED     Recent Results (from the past 8760 hours)   CBC With Differential With Platelet    Collection Time: 11/16/24  7:51 AM   Result Value Ref Range    WBC 5.3 4.0 - 11.0 x10(3) uL    RBC 4.87 4.30 - 5.70 x10(6)uL    HGB 14.4 13.0 - 17.5 g/dL    HCT 42.7 39.0 - 53.0 %    MCV 87.7 80.0 - 100.0 fL    MCH 29.6 26.0 - 34.0 pg    MCHC 33.7 31.0 - 37.0 g/dL    RDW-SD 39.8 35.1 - 46.3 fL    RDW 12.4 11.0 - 15.0 %    .0 150.0 - 450.0 10(3)uL    Neutrophil Absolute Prelim 2.90 1.50 - 7.70 x10 (3) uL    Neutrophil Absolute 2.90 1.50 - 7.70 x10(3) uL    Lymphocyte Absolute 1.89 1.00 - 4.00 x10(3) uL    Monocyte Absolute 0.44 0.10 - 1.00 x10(3) uL    Eosinophil Absolute 0.07 0.00 - 0.70 x10(3) uL    Basophil Absolute 0.03 0.00 - 0.20 x10(3) uL    Immature Granulocyte Absolute 0.01 0.00 - 1.00 x10(3) uL    Neutrophil % 54.3 %    Lymphocyte % 35.4 %    Monocyte % 8.2 %    Eosinophil % 1.3 %    Basophil % 0.6 %    Immature Granulocyte % 0.2 %     *Note: Due to a large number of results and/or encounters for the requested time period, some results have not been displayed. A complete set of results can be found in Results Review.       Recent Results (from the  past 8760 hours)   Comp Metabolic Panel (14)    Collection Time: 11/16/24  7:51 AM   Result Value Ref Range    Glucose 84 70 - 99 mg/dL    Sodium 143 136 - 145 mmol/L    Potassium 4.1 3.5 - 5.1 mmol/L    Chloride 107 98 - 112 mmol/L    CO2 30.0 21.0 - 32.0 mmol/L    Anion Gap 6 0 - 18 mmol/L    BUN 18 9 - 23 mg/dL    Creatinine 1.13 0.70 - 1.30 mg/dL    BUN/CREA Ratio 15.9 10.0 - 20.0    Calcium, Total 9.9 8.7 - 10.4 mg/dL    Calculated Osmolality 297 (H) 275 - 295 mOsm/kg    eGFR-Cr 83 >=60 mL/min/1.73m2    ALT 14 10 - 49 U/L    AST 20 <34 U/L    Alkaline Phosphatase 47 45 - 117 U/L    Bilirubin, Total 0.6 0.3 - 1.2 mg/dL    Total Protein 6.8 5.7 - 8.2 g/dL    Albumin 4.3 3.2 - 4.8 g/dL    Globulin  2.5 2.0 - 3.5 g/dL    A/G Ratio 1.7 1.0 - 2.0    Patient Fasting for CMP? Yes      *Note: Due to a large number of results and/or encounters for the requested time period, some results have not been displayed. A complete set of results can be found in Results Review.           Imaging:  X-ray of right foot 9/10/2020  FINDINGS:          BONES:             Mild joint space narrowing with small osteophytes at the first MTP joint.  Small bunion the great toe metatarsal head.  Subtle irregularity seen at the great toe IP joint. There is no acute fracture or dislocation.  SOFT TISSUES:            There is dorsal soft tissue swelling.  EFFUSION:       None visible.   OTHER:             Negative.      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      DATA REVIEWED  Pathology - EGD 9/21/2015  FINAL DIAGNOSIS                         Distal esophagus; biopsy:     *  Squamocolumnar junctional mucosa with changes consistent with mild reflux   esophagitis.     *  Diff-Quik stain negative for Helicobacter pylori organisms (appropriate   control).     *  No goblet cell metaplasia or dysplasia identified.       Colonoscopy 9/10/2024  Final Diagnosis:      A.  Ascending colon polyp x2 :  Fragments of sessile serrated adenoma.     B. Rectosigmoid colon polyp x3:  Hyperplastic polyp x 3.         ASSESSMENT/PLAN:         Hx of gout  Last flare up in 2020  - Check Uric acid level, Last uric acid level within normal limits  - Trying to adhere to low purine diet and other modifications of diet to decrease another episode of gout  -Will monitor for any changes.  Consideration of osteoarthritis as an alternative diagnosis.    Episodic abdominal pain  Chronic diarrhea  Prior work-up with Dr. Tapia - colonoscopy which showed colon polyps, negative for microscopic colitis  - Repeat colonoscopy completed 9/10/2024.  May be due in 3 years    GERD  -EGD pathology reviewed as above, at that time in 2015 had acid reflux    Family history of heart disease  -Lipid panel with total cholesterol 248,   -Patient demonstrates some concern as his father had heart attack in his 50s.  He also has been to siblings that are on cholesterol medication in relatively good health.  - Agree with CT calcium score, scheduled for January.  -Repeat lipid panel in 4-6 months  -We discussed that he is doing a good job with modifiable risk factors with maintaining his good overall weight, undergoing cardiovascular exercise, undergoing good nutritional habits.  - We also discussed nonmodifiable risk factors with age over time and family history that we should keep a close eye on cholesterol levels over time  - Consideration of DASH diet, Mediterranean diet as a potential resource       Orders This Visit:  No orders of the defined types were placed in this encounter.      Meds This Visit:  Requested Prescriptions      No prescriptions requested or ordered in this encounter       Imaging & Referrals:  None       Health Maintenance  HTN Screen: BP at goal  DM Screen: As above  HLD Screen: As above  HCV Screen: Considered low risk  HIV Screen: considered low risk  G/C/Syphilis: Considered low risk    Colon  Cancer Screening (45-70): Next colonoscopy 2027  Prostate Cancer Screening: (50-70): Not indicated  Lung Cancer Screening (55-79 with 30 p/year and active < 15 years): Not indicated  AAA Screening (65-75 Hx of smoking): Not indicated    Influenza: Annually   Td/Tdap: Last Tdap - due  Zoster (50+):  Not indicated  HPV (19-26):  Not indicated  Pneumococcal:  Not indicated    Immunization History   Administered Date(s) Administered    Covid-19 Vaccine Moderna 100 mcg/0.5 ml 04/08/2021, 05/12/2021    Covid-19 Vaccine Moderna 50 Mcg/0.25 Ml 12/03/2021    FLULAVAL 6 months & older 0.5 ml Prefilled syringe (97369) 09/24/2020    FLUZONE 6 months and older PFS 0.5 ml (37507) 12/03/2021       Return to clinic in 1 year for annual physical examination    Scott Edwards MD, 11/18/24, 2:14 PM

## 2024-11-18 ENCOUNTER — OFFICE VISIT (OUTPATIENT)
Dept: INTERNAL MEDICINE CLINIC | Facility: CLINIC | Age: 44
End: 2024-11-18

## 2024-11-18 VITALS
HEIGHT: 71.1 IN | SYSTOLIC BLOOD PRESSURE: 126 MMHG | BODY MASS INDEX: 22.93 KG/M2 | WEIGHT: 165.63 LBS | TEMPERATURE: 98 F | OXYGEN SATURATION: 98 % | HEART RATE: 81 BPM | DIASTOLIC BLOOD PRESSURE: 82 MMHG

## 2024-11-18 DIAGNOSIS — E78.5 DYSLIPIDEMIA: ICD-10-CM

## 2024-11-18 DIAGNOSIS — K21.9 GASTROESOPHAGEAL REFLUX DISEASE WITHOUT ESOPHAGITIS: ICD-10-CM

## 2024-11-18 DIAGNOSIS — Z82.49 FAMILY HISTORY OF HEART DISEASE: ICD-10-CM

## 2024-11-18 DIAGNOSIS — Z87.39 HISTORY OF GOUT: ICD-10-CM

## 2024-11-18 DIAGNOSIS — Z00.00 ANNUAL PHYSICAL EXAM: Primary | ICD-10-CM

## 2024-11-18 DIAGNOSIS — M10.079 IDIOPATHIC GOUT INVOLVING TOE, UNSPECIFIED CHRONICITY, UNSPECIFIED LATERALITY: ICD-10-CM

## 2025-01-06 ENCOUNTER — HOSPITAL ENCOUNTER (OUTPATIENT)
Dept: CT IMAGING | Facility: HOSPITAL | Age: 45
End: 2025-01-06
Attending: INTERNAL MEDICINE

## 2025-01-06 DIAGNOSIS — Z13.6 SCREENING FOR CARDIOVASCULAR CONDITION: ICD-10-CM

## 2025-01-20 ENCOUNTER — TELEPHONE (OUTPATIENT)
Dept: INTERNAL MEDICINE CLINIC | Facility: CLINIC | Age: 45
End: 2025-01-20

## 2025-07-09 ENCOUNTER — TELEPHONE (OUTPATIENT)
Dept: INTERNAL MEDICINE CLINIC | Facility: CLINIC | Age: 45
End: 2025-07-09

## 2025-07-25 ENCOUNTER — OFFICE VISIT (OUTPATIENT)
Dept: FAMILY MEDICINE CLINIC | Facility: CLINIC | Age: 45
End: 2025-07-25
Payer: COMMERCIAL

## 2025-07-25 VITALS
SYSTOLIC BLOOD PRESSURE: 118 MMHG | OXYGEN SATURATION: 98 % | RESPIRATION RATE: 16 BRPM | HEART RATE: 66 BPM | TEMPERATURE: 98 F | WEIGHT: 168.63 LBS | HEIGHT: 71.1 IN | DIASTOLIC BLOOD PRESSURE: 72 MMHG | BODY MASS INDEX: 23.35 KG/M2

## 2025-07-25 DIAGNOSIS — M79.12 STERNOCLEIDOMASTOID MUSCLE TENDERNESS: ICD-10-CM

## 2025-07-25 DIAGNOSIS — R07.0 THROAT PAIN: Primary | ICD-10-CM

## 2025-07-26 NOTE — PATIENT INSTRUCTIONS
Use ibuprofen as needed.   You may wish to apply topical menthol preparation to the area to reduce pain and muscle tightness.   Work on gentle stretching and massage.   If no better in 1 week or if symptoms worsen, follow-up with your PCP for further evaluation.

## (undated) NOTE — LETTER
5/23/2024    Alejandro Villarreal        [address]        [place] [address]            Dear Alejandro Villarreal,      Our records indicate that you are due for an appointment for a Colonoscopy with Jose R Tapia MD. Our doctors are booking out about 3-6 months in advance for procedures.     Please call our office to schedule a phone screening appointment to plan for the procedure(s).   Your medical well-being is important to us.    If your insurance requires a referral, please call your primary care office to request one.      Thank you,      The Physicians and Staff at [facility] ([place], [place])

## (undated) NOTE — LETTER
08/19/21        [address]      Dear Ming Barrera,    Our records indicate that you have outstanding lab work and or testing that was ordered for you and has not yet been completed:     CBC WITH DIFFERENTIAL WITH PLAT